# Patient Record
Sex: MALE | Race: WHITE | Employment: FULL TIME
[De-identification: names, ages, dates, MRNs, and addresses within clinical notes are randomized per-mention and may not be internally consistent; named-entity substitution may affect disease eponyms.]

---

## 2017-05-26 ENCOUNTER — HEALTH MAINTENANCE LETTER (OUTPATIENT)
Age: 22
End: 2017-05-26

## 2021-07-26 ENCOUNTER — ANCILLARY PROCEDURE (OUTPATIENT)
Dept: GENERAL RADIOLOGY | Facility: CLINIC | Age: 26
End: 2021-07-26
Attending: FAMILY MEDICINE

## 2021-07-26 ENCOUNTER — OFFICE VISIT (OUTPATIENT)
Dept: FAMILY MEDICINE | Facility: CLINIC | Age: 26
End: 2021-07-26

## 2021-07-26 VITALS
DIASTOLIC BLOOD PRESSURE: 70 MMHG | HEIGHT: 72 IN | SYSTOLIC BLOOD PRESSURE: 120 MMHG | OXYGEN SATURATION: 99 % | HEART RATE: 53 BPM | WEIGHT: 256 LBS | TEMPERATURE: 97.5 F | RESPIRATION RATE: 16 BRPM | BODY MASS INDEX: 34.67 KG/M2

## 2021-07-26 DIAGNOSIS — S99.912A INJURY OF LEFT ANKLE, INITIAL ENCOUNTER: Primary | ICD-10-CM

## 2021-07-26 DIAGNOSIS — S99.912A INJURY OF LEFT ANKLE, INITIAL ENCOUNTER: ICD-10-CM

## 2021-07-26 PROCEDURE — 99203 OFFICE O/P NEW LOW 30 MIN: CPT | Performed by: FAMILY MEDICINE

## 2021-07-26 PROCEDURE — 73610 X-RAY EXAM OF ANKLE: CPT | Mod: LT | Performed by: RADIOLOGY

## 2021-07-26 ASSESSMENT — PAIN SCALES - GENERAL: PAINLEVEL: MILD PAIN (2)

## 2021-07-26 ASSESSMENT — MIFFLIN-ST. JEOR: SCORE: 2190.59

## 2021-07-26 NOTE — PROGRESS NOTES
Assessment & Plan   1. Injury of left ankle, initial encounter: X-ray read by radiology shows no signs of fracture.  Encourage Tri-Lock brace, elevation, ice, compression, NSAIDs and Tylenol use for pain.  Discussed gradual return to play/action.  Start with walking before jogging, before running etc.  Patient agreeable plan.  Discussed proprioception training.  Follow-up if not improving over the next 2 to 4 weeks.  - XR Ankle Left G/E 3 Views; Future      Return in about 2 weeks (around 8/9/2021), or if symptoms worsen or fail to improve.    Warren Snwo MD  [facility]    This chart is completed utilizing dictation software; typos and/or incorrect word substitutions may unintentionally occur.      Subjective     Trey Capps is a 25 year old male who presents to clinic today for the following health issues:    Musculoskeletal problem/pain  Onset/Duration: 2 days  Description  Location: ankle - left  Joint Swelling: YES  Redness: no  Pain: YES  Warmth: no  Intensity:  2/10  Progression of Symptoms:  intermittent  Accompanying signs and symptoms:   Fevers: no  Numbness/tingling/weakness: no  History  Trauma to the area: YES- Jumped and rolled ankle during volleyball  Recent illness:  no  Previous similar problem: no  Previous evaluation:  no  Precipitating or alleviating factors:  Aggravating factors include: walking  Therapies tried and outcome: rest/inactivity, ice, elevating, acetaminophen and Ibuprofen    Patient states he was intoxicated playing volleyball in the sand on Saturday.  He went to go spike a ball and landed with an inversion injury and other players falling on top of him.  He states he was able to play through this pain but again was intoxicated.  Noticed the pain when he got home was worse.  He is able to walk on it; however, does continue to have pain.    Has been treating this with compression, ice, 1000 g of Tylenol and 800 mg of ibuprofen.   "Denies any extremity numbness or tingling.    Review of Systems   Constitutional, lymph, MSK, Derm, cardiovascular, pulmonary, gi systems are negative, except as otherwise noted.      Objective    /70   Pulse 53   Temp 97.5  F (36.4  C) (Temporal)   Resp 16   Ht 1.839 m (6' 0.4\")   Wt 116.1 kg (256 lb)   SpO2 99%   BMI 34.34 kg/m    Body mass index is 34.34 kg/m .  Physical Exam   General: Appears well and in no acute distress.  Cardiovascular: Normal DP and posterior tibial pulses.   Respiratory: Unlabored breathing.  Ankle Musculoskeletal Exam: Pain to palpation over distal aspect of posterior fibula/lateral malleolus as well as CFL and PTFL.  Significant swelling of the lateral foot.. Non-tender to palpation over ATFL, deltoid ligament, and tibia-fibula syndesmosis. Non-tender to palpation over posterior aspect of medial malleolus, base of the 5th metatarsal, and navicular. Compression test negative. Anterior drawer negative. Talar tilt negative.      X-ray: Unable to personally view results due to PACS system issues.  Radiology report as below.      XR ANKLE LEFT G/E 3 VIEWS 7/26/2021 8:32 AM      HISTORY: Injury of left ankle, initial encounter                                                                      IMPRESSION: Lateral soft tissue swelling. Ligament injury is  suspected. No apparent acute fracture. The ankle mortise appears  congruent.  "

## 2021-07-26 NOTE — PATIENT INSTRUCTIONS
Patient Education     Treating Ankle Sprains  Treatment will depend on how bad your sprain is. For a severe sprain, healing may take 3 months or more.  Right after your injury: Use R.I.C.E.    BIG: Rest: At first, keep weight off the ankle as much as you can. You may be given crutches to help you walk without putting weight on the ankle.    BIG: Ice: Put an ice pack on the ankle for 20 minutes. Remove the pack and wait at least 30 minutes. Repeat for up to 3 days. This helps reduce swelling.    BIG: Compression: To reduce swelling and keep the joint stable, you may need to wrap the ankle with an elastic bandage. For more severe sprains, you may need an ankle brace, a boot, or a cast.    BIG: Elevation: To reduce swelling, keep your ankle raised above your heart when you sit or lie down.  Medicine  Your healthcare provider may suggest oral nonsteroidal anti-inflammatory medicine (NSAIDs), such as ibuprofen. This relieves the pain and helps reduce swelling. Be sure to take your medicine as directed.  Exercises    After about 2 to 3 weeks, you may be given exercises to strengthen the ligaments and muscles in the ankle. Doing these exercises will help prevent another ankle sprain. Exercises may include standing on your toes and then on your heels and doing ankle curls.    Sit on the edge of a sturdy table or lie on your back.    Pull your toes toward you. Then point them away from you. Repeat for 2 to 3 minutes.  [employer] last reviewed this educational content on 1/1/2018 2000-2021 [employer]. All rights reserved. This information is not intended as a substitute for professional medical care. Always follow your healthcare professional's instructions.